# Patient Record
Sex: MALE | Race: WHITE | NOT HISPANIC OR LATINO | Employment: OTHER | URBAN - METROPOLITAN AREA
[De-identification: names, ages, dates, MRNs, and addresses within clinical notes are randomized per-mention and may not be internally consistent; named-entity substitution may affect disease eponyms.]

---

## 2017-12-18 ENCOUNTER — APPOINTMENT (OUTPATIENT)
Dept: SLEEP MEDICINE | Facility: HOSPITAL | Age: 57
End: 2017-12-18
Attending: INTERNAL MEDICINE

## 2018-01-08 ENCOUNTER — OFFICE VISIT (OUTPATIENT)
Dept: SLEEP MEDICINE | Facility: HOSPITAL | Age: 58
End: 2018-01-08
Attending: INTERNAL MEDICINE

## 2018-01-08 VITALS
HEART RATE: 101 BPM | BODY MASS INDEX: 40.43 KG/M2 | DIASTOLIC BLOOD PRESSURE: 81 MMHG | HEIGHT: 74 IN | WEIGHT: 315 LBS | SYSTOLIC BLOOD PRESSURE: 151 MMHG

## 2018-01-08 DIAGNOSIS — G47.33 OSA ON CPAP: Primary | ICD-10-CM

## 2018-01-08 DIAGNOSIS — Z99.89 OSA ON CPAP: Primary | ICD-10-CM

## 2018-01-08 PROCEDURE — G0463 HOSPITAL OUTPT CLINIC VISIT: HCPCS

## 2018-01-08 NOTE — PROGRESS NOTES
"Group: [facility]         CONSULT NOTE    Patient Identification:  Malcolm Millard  57 y.o.  male  1960  [id number]            Requesting physician: Primary care physician    Reason for Consultation:  KAT follow-up    CC:     History of Present Illness:  Patient is a pleasant 57-year-old gentleman known history of KAT with insomnia.  He was last seen 2015.  Currently on CPAP 14 cm.  He underwent lap band surgery last year and has lost a total of 185 pounds since 2011.  He continues to use his CPAP every night.  Unfortunately [facility] is down and unable to get any download today.  He feels rested in the morning.  Bedtime 10.  Awake time 7 AM.  Miochol abuse in abuse.  Currently has a nasal mask that fits well.  Masses 1-year-old.  [place] is 6 out of 24 within normal limits.      Review of Systems    Past Medical History:  No past medical history on file.  Reviewed  Past Surgical History:  No past surgical history on file.   Reviewed  Home Meds:    (Not in a hospital admission)  Reviewed  Allergies:  Allergies not on file    Social History:   Social History     Social History   • Marital status:      Spouse name: N/A   • Number of children: N/A   • Years of education: N/A     Occupational History   • Not on file.     Social History Main Topics   • Smoking status: Not on file   • Smokeless tobacco: Not on file   • Alcohol use Not on file   • Drug use: Not on file   • Sexual activity: Not on file     Other Topics Concern   • Not on file     Social History Narrative       Family History:  No family history on file.    Physical Exam:  /81  Pulse 101  Ht 188 cm (74\")  Wt (!) 155 kg (341 lb)  BMI 43.78 kg/m2 Body mass index is 43.78 kg/(m^2).   (!) 155 kg (341 lb)  Physical Exam  Middle-aged gentleman in no distress no labored breathing  ENT Mallampati 1  Neck is supple no bruit no adenopathy  Chest is clear CVS are regular rate and rhythm no  Abdomen is soft nontender bowel sounds " positive  Extremity no edema  CNS no deficits  No hallucination and delusional joint deformities or skin rashes    LABS:  No results found for: CALCIUM, PHOS    No results found for: CKTOTAL, CKMB, CKMBINDEX, TROPONINI, TROPONINT                              No results found for: TSH  CrCl cannot be calculated (No order found.).         Imaging: I personally visualized the images of scans/x-rays performed within last 3 days.      Assessment:  KAT  Obesity status post gastric bypass significant weight loss  Neurodermatitis  Insomnia      Recommendations:  Unfortunately download not available today  Continue CPAP 14 cm  Patient still in the process of losing weight  Will require re-titration versus restudied to see if KAT still present  Sleep hygiene measures  Supplies are renewed  Patient request new small CPAP air mini prescription given            Danielle Rodgers MD  1/8/2018  9:06 AM      Much of this encounter note is an electronic transcription/translation of spoken language to printed text using Dragon Software.

## 2019-03-25 ENCOUNTER — OFFICE VISIT (OUTPATIENT)
Dept: SLEEP MEDICINE | Facility: HOSPITAL | Age: 59
End: 2019-03-25

## 2019-03-25 VITALS
WEIGHT: 315 LBS | SYSTOLIC BLOOD PRESSURE: 122 MMHG | OXYGEN SATURATION: 96 % | HEART RATE: 84 BPM | HEIGHT: 75 IN | BODY MASS INDEX: 39.17 KG/M2 | DIASTOLIC BLOOD PRESSURE: 77 MMHG

## 2019-03-25 DIAGNOSIS — G47.33 OSA ON CPAP: Primary | ICD-10-CM

## 2019-03-25 DIAGNOSIS — Z99.89 OSA ON CPAP: Primary | ICD-10-CM

## 2019-03-25 PROCEDURE — G0463 HOSPITAL OUTPT CLINIC VISIT: HCPCS

## 2019-03-25 NOTE — PROGRESS NOTES
"[facility]         Dr Danielle Rodgers  [unfilled]  Patient Care Team:  Samia oBb MD as PCP - General  Samia Bob MD as PCP - Family Medicine    Chief Complaint: KAT with significant weight loss due to gastric bypass neurodermatitis and insomnia    Interval History: Patient returns today for a follow-up.  Currently on CPAP 14 cm.  Once again he did not bring his smart card and no modem on the system.  We are unable to review his download.  He tells me he religiously uses the machine.  He is still consistently losing weight.  Goes to bed 11:30 PM gets up between 8:55 AM.  Gets about 5 hours of sleep and feels rested and better with the CPAP.  He reports today mask leaks in his sleep bedtime.  No tobacco no alcohol no caffeine abuse.  His insulin was changed on the dosage.  He currently has a full facemask that leaks as above.  No parasomnias reported.    REVIEW OF SYSTEMS:   CARDIOVASCULAR: No chest pain, chest pressure or chest discomfort. No palpitations or edema.   RESPIRATORY: No shortness of breath, cough or sputum.   GASTROINTESTINAL: No anorexia, nausea, vomiting or diarrhea. No abdominal pain or blood.   HEMATOLOGIC: No bleeding or bruising.   [place] 2 out of 24 within normal limits  Ventilator/Non-Invasive Ventilation Settings (From admission, onward)    None            Vital Signs  Heart Rate:  [84] 84  BP: (122)/(77) 122/77  [unfilled]  Flowsheet Rows      First Filed Value   Admission Height  190.5 cm (75\") Documented at 03/25/2019 1300   Admission Weight  154 kg (340 lb)  (Abnormal)  Documented at 03/25/2019 1300          Physical Exam:   General Appearance:    Alert, cooperative, in no acute distress   Lungs:     Clear to auscultation,respirations regular, even and                  unlabored  ENT Mallampati between 3 and 4    Heart:    Regular rhythm and normal rate, normal S1 and S2, no            murmur, no gallop, no rub, no click   Chest Wall:    No abnormalities observed "   Abdomen:     Normal bowel sounds, no masses, no organomegaly, soft        non-tender, non-distended, no guarding, no rebound                tenderness   Extremities:   Moves all extremities well, no edema, no cyanosis, no             redness     Results Review:                                          I reviewed the patient's new clinical results.  I personally viewed and interpreted the patient's CXR        Medication Review:         No current facility-administered medications for this visit.     ASSESSMENT:   KAT  Obesity status post gastric bypass significant weight loss  Neurodermatitis  Insomnia        PLAN:  At this point we have gentleman with known history of KAT obesity with persistent weight loss due to gastric bypass  He tells me that he sometimes dozes off without the machine and he starts snoring.  He still uses the CPAP religiously palpation.  I have asked him to get his smart card so that I can review the compliance download  Advised patient to continue losing weight  We could do another sleep study once he is lost additional weight  Sleep hygiene measures  He uses Ambien as needed for insomnia  Advised patient sleep hygiene measures  We will see him back in 6 months    Danielle Rodgers MD  03/25/19  2:43 PM

## 2019-09-23 ENCOUNTER — OFFICE VISIT (OUTPATIENT)
Dept: SLEEP MEDICINE | Facility: HOSPITAL | Age: 59
End: 2019-09-23

## 2019-09-23 VITALS
HEIGHT: 75 IN | OXYGEN SATURATION: 94 % | WEIGHT: 315 LBS | SYSTOLIC BLOOD PRESSURE: 150 MMHG | HEART RATE: 96 BPM | BODY MASS INDEX: 39.17 KG/M2 | DIASTOLIC BLOOD PRESSURE: 94 MMHG

## 2019-09-23 DIAGNOSIS — Z99.89 OSA ON CPAP: Primary | ICD-10-CM

## 2019-09-23 DIAGNOSIS — G47.33 OSA ON CPAP: Primary | ICD-10-CM

## 2019-09-23 PROCEDURE — G0463 HOSPITAL OUTPT CLINIC VISIT: HCPCS

## 2019-09-23 NOTE — PROGRESS NOTES
"[facility] PULMONARY CARE         Dr Danielle Rodgers  [unfilled]  Patient Care Team:  Samia Bob MD as PCP - General  Samia Bob MD as PCP - Family Medicine    Chief Complaint: KAT with neurodermatitis and obesity    Interval History: Patient here for compliance visit.  As you recall last visit did not have his card.  Currently on CPAP 14 cm.  Compliance is 99% average daily use 8 hours 52 minutes.  AHI 4.4 events per hour leak high of 85 L.  Patient reports benefiting from CPAP.  He has had gastric bypass done and he lost some weight but gained some of it back.  Currently feels rested with the machine.  He sometimes complains the hose falls off and he finds it lying on the floor.  Goes to bed between 10 PM to 2 AM gets up at 9 PM.  Gets about 5 hours of sleep and feels rested.  No tobacco no alcohol no caffeine abuse.  Currently has a full facemask that fits well.    REVIEW OF SYSTEMS:   CARDIOVASCULAR: No chest pain, chest pressure or chest discomfort. No palpitations or edema.   RESPIRATORY: No shortness of breath, cough or sputum.   GASTROINTESTINAL: No anorexia, nausea, vomiting or diarrhea. No abdominal pain or blood.   HEMATOLOGIC: No bleeding or bruising.  Positive for shortness of breath  [place] 7 out of 24 within normal limits    Ventilator/Non-Invasive Ventilation Settings (From admission, onward)    None            Vital Signs  Heart Rate:  [96] 96  BP: (150)/(94) 150/94  [unfilled]  Flowsheet Rows      First Filed Value   Admission Height  190.5 cm (75\") Documented at 09/23/2019 1348   Admission Weight  162 kg (357 lb)  (Abnormal)  Documented at 09/23/2019 1348          Physical Exam:   General Appearance:    Alert, cooperative, in no acute distress   Lungs:     Clear to auscultation,respirations regular, even and                  unlabored  ENT Mallampati 1 with normal nasal exam    Heart:    Regular rhythm and normal rate, normal S1 and S2, no            murmur, no gallop, no rub, no click "   Chest Wall:    No abnormalities observed   Abdomen:     Normal bowel sounds, no masses, no organomegaly, soft        non-tender, non-distended, no guarding, no rebound                tenderness   Extremities:   Moves all extremities well, no edema, no cyanosis, no             redness     Results Review:                                          I reviewed the patient's new clinical results.  I personally viewed and interpreted the patient's CXR        Medication Review:         No current facility-administered medications for this visit.     ASSESSMENT:   KAT on CPAP  Obesity status post gastric bypass significant weight loss  Neurodermatitis  Insomnia    PLAN:  Reviewed compliance download compliance and AHI are okay however leak is high.  Patient thinks this is likely because the hose falls off the mask.  I have asked him to contact the DME company to see if he could have a way to keep the house in place.  It seems more of a technical issue  He still has a neurodermatitis currently being treated by his primary care physician  Improve sleep hygiene measures  Avoidance of alcohol at bedtime  I will see him back in 1 year    Danielle Rodgers MD  09/23/19  1:58 PM

## 2020-11-02 ENCOUNTER — OFFICE VISIT (OUTPATIENT)
Dept: SLEEP MEDICINE | Facility: HOSPITAL | Age: 60
End: 2020-11-02

## 2020-11-02 VITALS
DIASTOLIC BLOOD PRESSURE: 84 MMHG | WEIGHT: 315 LBS | SYSTOLIC BLOOD PRESSURE: 193 MMHG | HEART RATE: 99 BPM | OXYGEN SATURATION: 96 % | BODY MASS INDEX: 39.17 KG/M2 | HEIGHT: 75 IN

## 2020-11-02 DIAGNOSIS — G47.33 OSA ON CPAP: Primary | ICD-10-CM

## 2020-11-02 DIAGNOSIS — Z99.89 OSA ON CPAP: Primary | ICD-10-CM

## 2020-11-02 DIAGNOSIS — I10 ESSENTIAL HYPERTENSION: ICD-10-CM

## 2020-11-02 DIAGNOSIS — E66.01 MORBID (SEVERE) OBESITY DUE TO EXCESS CALORIES (HCC): ICD-10-CM

## 2020-11-02 PROCEDURE — G0463 HOSPITAL OUTPT CLINIC VISIT: HCPCS

## 2020-11-02 NOTE — PROGRESS NOTES
"[facility]  Telephone: [phone] / Fax: [phone] [place]  Telephone: [phone] / Fax: [phone] Jackeline Stewart    PCP: Samia Bob MD    Reason for visit: KAT f/u    Malcolm Millard is a 59 y.o.male  was last seen at [facility] sleep lab 1 year ago. He is a patient of Dr Cueto. He has been on the CPAP machine for many years. His most recent machine broke last night.  He got it around 2013. He is here today to obtain order for CPAP replacement. He wants to use [employer] for a new machine. His latest sleep study was done here in 2013. It showed moderate KAT with AHI 28/hr, supine AHI 35/hr.  He picked up 10 lbs during pandemic. Otherwise, he denies any interval changes. His BP was very high today. It is usually normal.  His sleep schedule is 11pm-6am. ESS is 6.    SH- no tobacco, no alcohol, drinks 3 cups of coffee per day, no energy drinks.    ROS- negative.    DME [facility]     Current Medications:  Unchanged  Dupixent added for eczema    Patient  has no past medical history on file.    I have reviewed the Past Medical History, Past Surgical History, Social History and Family History.    Vital Signs BP (!) 193/84 (usually it is normal)-  Pulse 99   Ht 190.5 cm (75\")   Wt (!) 167 kg (367 lb 8 oz)   SpO2 96%   BMI 45.93 kg/m²  Body mass index is 45.93 kg/m².    General Alert and oriented. No acute distress noted   Pharynx/Throat Class IV Mallampati airway, large tongue, no evidence of redundant lateral pharyngeal tissue. No oral lesions. No thrush. Moist mucous membranes.   Head Normocephalic. Symmetrical. Atraumatic.    Nose No septal deviation. No drainage   Chest Wall Normal shape. Symmetric expansion with respiration. No tenderness.   Neck Trachea midline, no thyromegaly or adenopathy    Lungs Clear to auscultation bilaterally. No wheezes. No rhonchi. No rales. Respirations regular, even and unlabored.   Heart Regular rhythm and normal rate. Normal S1 and S2. No murmur "   Abdomen Soft, non-tender and non-distended. Normal bowel sounds. No masses.   Extremities Moves all extremities well. No edema   Psychiatric Normal mood and affect.     Testing:  · Download 8/4/20-10/12/20- 76% use with average nightly use of 8 hours and 24 minutes on CPAP 14cm with AHI  5.1.    Study-PSG 2013- AHI 28/hr, supine AHI 35/hr.      Impression:  1. KAT on CPAP    2. Morbid (severe) obesity due to excess calories (CMS/HCC)    3. Essential hypertension          Plan:  BP is very high today. It is usually normal-per patient. He was advised to recheck BP when he gets home and inform PCP if still elevated. His machine is broken beyond repair and requires replacement ASAP. He wants to go to [facility] now to obtain replacement. I gave him a copy of the order and sleep study to take with him. He is also inquiring about portable machines. Separate order was provided to him so that he can explore online options if the price is less. As his AHI is slightly elevated and he gained some weight during pandemic, I will order the new machine at [employer] [id number]. He is currently on a fixed pressure of 14cm. He also needs new supplies.    He uses the CPAP device and benefits from its use in terms of reduction of hypersomnia and snoring.  AHI appears to be within adequate range. I reviewed download report and original sleep study report with the patient.  Weight loss will be strongly beneficial to reduce the severity of sleep-disordered breathing.  Caution during activities that require prolonged concentration is strongly advised if sleepiness returns.     Follow up with Dr. Rodgers in 8 weeks     Thank you for allowing me to participate in your patient's care.      ENRIQUE Sinha  [facility]  Phone: [phone]      Part of this note may be an electronic transcription/translation of spoken language to printed text using the Dragon Dictation System.

## 2020-12-14 ENCOUNTER — OFFICE VISIT (OUTPATIENT)
Dept: SLEEP MEDICINE | Facility: HOSPITAL | Age: 60
End: 2020-12-14

## 2020-12-14 VITALS
DIASTOLIC BLOOD PRESSURE: 75 MMHG | WEIGHT: 315 LBS | HEIGHT: 75 IN | OXYGEN SATURATION: 97 % | BODY MASS INDEX: 39.17 KG/M2 | SYSTOLIC BLOOD PRESSURE: 140 MMHG | HEART RATE: 102 BPM

## 2020-12-14 DIAGNOSIS — Z99.89 OSA ON CPAP: Primary | ICD-10-CM

## 2020-12-14 DIAGNOSIS — G47.33 OSA ON CPAP: Primary | ICD-10-CM

## 2020-12-14 PROCEDURE — G0463 HOSPITAL OUTPT CLINIC VISIT: HCPCS

## 2020-12-14 NOTE — PROGRESS NOTES
"[facility] PULMONARY CARE         Dr Danielle Rodgers  [unfilled]  Patient Care Team:  Samia Bob MD as PCP - General  Samia Bob MD as PCP - Family Medicine    Chief Complaint:. His latest sleep study was done here in 2013. It showed moderate KAT with AHI 28/hr, supine AHI 35/hr.     Interval History: Patient here for compliance visit.  His old CPAP broke and apparently [employer] ordered a new auto CPAP 14 to 20 cm.  He tells me that he has a loaner CPAP.  Compliance 98% average daily use 8 hours 55 minutes.  AHI leak both excellent.  Patient benefiting from current CPAP.  Wants his new auto CPAP ASAP.  Goes to bed at 8 PM gets up 9 AM gets about 10 hours of sleep and feels rested.  No tobacco no alcohol no caffeine abuse.  Currently has a full facemask that fits well.  REVIEW OF SYSTEMS:   CARDIOVASCULAR: No chest pain, chest pressure or chest discomfort. No palpitations or edema.   RESPIRATORY: No shortness of breath, cough or sputum.   GASTROINTESTINAL: No anorexia, nausea, vomiting or diarrhea. No abdominal pain or blood.   HEMATOLOGIC: No bleeding or bruising.  [place] 5 out of 24 within normal limits    Ventilator/Non-Invasive Ventilation Settings (From admission, onward)    None            Vital Signs  Heart Rate:  [102] 102  BP: (140)/(75) 140/75  [unfilled]  Flowsheet Rows      First Filed Value   Admission Height  190.5 cm (75\") Documented at 12/14/2020 1103   Admission Weight  (!) 167 kg (369 lb) Documented at 12/14/2020 1103          Physical Exam:  Patient is examined using the personal protective equipment as per guidelines from infection control for this particular patient as enacted.  Hand hygiene was performed before and after patient interaction.   General Appearance:    Alert, cooperative, in no acute distress.  Following simple commands  Neck midline trachea no thyromegaly   Lungs:     Clear to auscultation,respirations regular, even and                  unlabored  ENT Mallampati between 3 " and 4 normal nasal exam    Heart:    Regular rhythm and normal rate, normal S1 and S2, no            murmur, no gallop, no rub, no click   Chest Wall:    No abnormalities observed   Abdomen:     Normal bowel sounds, no masses, no organomegaly, soft        non-tender, non-distended, no guarding, no rebound                tenderness   Extremities:   Moves all extremities well, no edema, no cyanosis, no             redness  CNS no focal neurological deficits normal sensory exam  Skin no rashes no nodules  Musculoskeletal no cyanosis no clubbing normal range of motion     Results Review:                                          I reviewed the patient's new clinical results.  I personally viewed and interpreted the patient's CXR        Medication Review:       No current facility-administered medications for this visit.       ASSESSMENT:   KAT on CPAP  Obesity status post gastric bypass significant weight loss  Neurodermatitis  Insomnia    PLAN:  1 patient apparently needs a new CPAP machine.  He tells me that he has been provided with a loaner.  Compliance download on the current CPAP auto 14 to 20 cm excellent with compliance 98% AHI and leak are excellent.  I will attempt to get him a new auto CPAP same pressure 14 to 20 cm ASAP.  Sleep hygiene measures  Weight loss to be continued  Treatment of underlying comorbidities  Sleep hygiene measures  Follow-up in 1 year    Danielle Rodgers MD  12/14/20  11:24 EST

## 2021-03-01 ENCOUNTER — OFFICE VISIT (OUTPATIENT)
Dept: SLEEP MEDICINE | Facility: HOSPITAL | Age: 61
End: 2021-03-01

## 2021-03-01 VITALS
SYSTOLIC BLOOD PRESSURE: 156 MMHG | WEIGHT: 315 LBS | HEIGHT: 74 IN | BODY MASS INDEX: 40.43 KG/M2 | OXYGEN SATURATION: 96 % | DIASTOLIC BLOOD PRESSURE: 94 MMHG | HEART RATE: 86 BPM

## 2021-03-01 DIAGNOSIS — G47.33 OSA ON CPAP: Primary | ICD-10-CM

## 2021-03-01 DIAGNOSIS — Z99.89 OSA ON CPAP: Primary | ICD-10-CM

## 2021-03-01 PROCEDURE — G0463 HOSPITAL OUTPT CLINIC VISIT: HCPCS

## 2021-03-01 NOTE — PROGRESS NOTES
"[facility] PULMONARY CARE         Dr Danielle Rodgers  [unfilled]  Patient Care Team:  Samia Bob MD as PCP - General  Samia Bob MD as PCP - Family Medicine    Chief Complaint:His latest sleep study was done here in 2013. It showed moderate KAT with AHI 28/hr, supine AHI 35/    Interval History: Patient here for compliance visit after getting a new auto CPAP.  Currently on auto CPAP 14 to 20 cm.  Previously his compliance AHI leak was excellent.  Unfortunately with a new CPAP we do not have the compliance download today.  He tells me he does not have a smart card in his machine.  He feels rested with a new CPAP and feels it is working well.  Goes to bed 1130 gets up 9 gets about 7 hours of sleep and feels rested.  No tobacco no alcohol no caffeine abuse.  Still has neurodermatitis and takes Dupixent with good control of it.  Supplies have been adequate for [employer].    REVIEW OF SYSTEMS:   CARDIOVASCULAR: No chest pain, chest pressure or chest discomfort. No palpitations or edema.   RESPIRATORY: No shortness of breath, cough or sputum.   GASTROINTESTINAL: No anorexia, nausea, vomiting or diarrhea. No abdominal pain or blood.   HEMATOLOGIC: No bleeding or bruising.  Positive for nasal congestion shortness of breath  [place] 7 out of 24 within normal limits    Ventilator/Non-Invasive Ventilation Settings (From admission, onward)    None            Vital Signs  Heart Rate:  [86] 86  BP: (156)/(94) 156/94  [unfilled]  Flowsheet Rows      First Filed Value   Admission Height  186.7 cm (73.5\") Documented at 03/01/2021 1057   Admission Weight  (!) 176 kg (389 lb) Documented at 03/01/2021 1057          Physical Exam:  Patient is examined using the personal protective equipment as per guidelines from infection control for this particular patient as enacted.  Hand hygiene was performed before and after patient interaction.   General Appearance:    Alert, cooperative, in no acute distress.  Following simple " commands  Neck midline trachea no thyromegaly   Lungs:     Clear to auscultation,respirations regular, even and                  unlabored  ENT Mallampati between 3 and 4 normal nasal exam    Heart:    Regular rhythm and normal rate, normal S1 and S2, no            murmur, no gallop, no rub, no click   Chest Wall:    No abnormalities observed   Abdomen:     Normal bowel sounds, no masses, no organomegaly, soft        non-tender, non-distended, no guarding, no rebound                tenderness   Extremities:   Moves all extremities well, no edema, no cyanosis, no             redness  CNS no focal neurological deficits normal sensory exam  Skin no rashes no nodules  Musculoskeletal no cyanosis no clubbing normal range of motion     Results Review:                                          I reviewed the patient's new clinical results.  I personally viewed and interpreted the patient's CXR        Medication Review:       No current facility-administered medications for this visit.       ASSESSMENT:   KAT on CPAP  Obesity status post gastric bypass  Neurodermatitis  Insomnia      PLAN:  New CPAP working well per patient.  We will attempt to get download after getting him a new smart card.  Weight loss encouraged  He is gaining back all the weight that he lost from his bypass  Weight loss encouraged  Insomnia sleep hygiene measures  We will review download and make adjustment accordingly      Danielle Rodgers MD  03/01/21  11:09 EST

## 2021-03-19 ENCOUNTER — BULK ORDERING (OUTPATIENT)
Dept: CASE MANAGEMENT | Facility: OTHER | Age: 61
End: 2021-03-19

## 2021-03-19 DIAGNOSIS — Z23 IMMUNIZATION DUE: ICD-10-CM

## 2022-02-28 ENCOUNTER — OFFICE VISIT (OUTPATIENT)
Dept: SLEEP MEDICINE | Facility: HOSPITAL | Age: 62
End: 2022-02-28

## 2022-02-28 VITALS
OXYGEN SATURATION: 95 % | HEIGHT: 74 IN | DIASTOLIC BLOOD PRESSURE: 81 MMHG | BODY MASS INDEX: 40.43 KG/M2 | WEIGHT: 315 LBS | SYSTOLIC BLOOD PRESSURE: 163 MMHG | HEART RATE: 99 BPM

## 2022-02-28 DIAGNOSIS — Z99.89 OSA ON CPAP: Primary | ICD-10-CM

## 2022-02-28 DIAGNOSIS — G47.33 OSA ON CPAP: Primary | ICD-10-CM

## 2022-02-28 PROCEDURE — G0463 HOSPITAL OUTPT CLINIC VISIT: HCPCS

## 2022-02-28 NOTE — PROGRESS NOTES
"[facility] PULMONARY CARE         Dr Danielle Rodgers  [unfilled]  Patient Care Team:  Samia Bob MD as PCP - General  Samia Bob MD as PCP - Family Medicine    Chief Complaint:His latest sleep study was done here in 2013. It showed moderate KAT with AHI 28/hr, supine AHI 35/    Interval History: Patient here for compliance visit annual visit.  Unfortunately compliance download not available today.  He tells me he uses the machine and currently mask is broken and request a new supplies and mask.  Goes to bed 12 gets up 930 gets about 7 to 8 hours of sleep more rested with CPAP.  No tobacco no alcohol no caffeine abuse.  Currently has a full facemask which currently is broken but generally fits well.  He gets Dupixent for his neurodermatitis.  Also got Covid in July and received a monoclonal antibody.    REVIEW OF SYSTEMS:   CARDIOVASCULAR: No chest pain, chest pressure or chest discomfort. No palpitations or edema.   RESPIRATORY: No, cough or sputum.   GASTROINTESTINAL: No anorexia, nausea, vomiting or diarrhea. No abdominal pain or blood.   HEMATOLOGIC: No bleeding or bruising.  Positive for nasal congestion shortness of breath  [place] 3 out of 24 within normal limits    Ventilator/Non-Invasive Ventilation Settings (From admission, onward)            None            Vital Signs  Heart Rate:  [99] 99  BP: (163)/(81) 163/81  [unfilled]  Flowsheet Rows      First Filed Value   Admission Height 188 cm (74\") Documented at 02/28/2022 1136   Admission Weight 173 kg (382 lb) Documented at 02/28/2022 1136          Physical Exam:  Patient is examined using the personal protective equipment as per guidelines from infection control for this particular patient as enacted.  Hand hygiene was performed before and after patient interaction.   General Appearance:    Alert, cooperative, in no acute distress.  Following simple commands  ENT Mallampati between 3 and 4 no nasal congestion  Neck midline trachea, no thyromegaly "   Lungs:     Clear to auscultation, respirations regular, even and                  unlabored    Heart:    Regular rhythm and normal rate, normal S1 and S2, no            murmur, no gallop, no rub, no click   Chest Wall:    No abnormalities observed   Abdomen:     Normal bowel sounds, no masses, no organomegaly, soft        nontender, nondistended, no guarding, no rebound                tenderness   Extremities:   Moves all extremities well, no edema, no cyanosis, no             redness  CNS no focal neurological deficits normal sensory exam  Skin no rashes no nodules  Musculoskeletal no cyanosis no clubbing normal range of motion     Results Review:                                          I reviewed the patient's new clinical results.  I personally viewed and interpreted the patient's chest x-ray.        Medication Review:       No current facility-administered medications for this visit.      ASSESSMENT:   KAT on CPAP  Obesity status post gastric bypass  Neurodermatitis  Insomnia    PLAN:  Unfortunately compliance download is still pending at this time.  I will review compliance download and decide if there is any changes that needs to be made.  Clinically seems to be doing better  Supplies to be renewed and ordered right now  Weight loss encouraged  Treatment of underlying comorbidities  Remains on Dupixent for neurodermatitis  Insomnia better controlled with CPAP  Follow-up in 1 year      Danielle Rodgers MD  02/28/22  11:51 EST

## 2023-03-13 ENCOUNTER — OFFICE VISIT (OUTPATIENT)
Dept: SLEEP MEDICINE | Facility: HOSPITAL | Age: 63
End: 2023-03-13
Payer: COMMERCIAL

## 2023-03-13 VITALS
DIASTOLIC BLOOD PRESSURE: 96 MMHG | HEIGHT: 74 IN | BODY MASS INDEX: 40.43 KG/M2 | OXYGEN SATURATION: 97 % | HEART RATE: 106 BPM | WEIGHT: 315 LBS | SYSTOLIC BLOOD PRESSURE: 159 MMHG

## 2023-03-13 DIAGNOSIS — G47.33 OSA ON CPAP: Primary | ICD-10-CM

## 2023-03-13 DIAGNOSIS — Z99.89 OSA ON CPAP: Primary | ICD-10-CM

## 2023-03-13 PROCEDURE — G0463 HOSPITAL OUTPT CLINIC VISIT: HCPCS

## 2023-03-13 NOTE — PROGRESS NOTES
[facility]         Dr Danielle Rodgers  [unfilled]  Patient Care Team:  Samia Bob MD as PCP - General  Samia Bob MD as PCP - Family Medicine    Chief Complaint:His latest sleep study was done here in 2013. It showed moderate KAT with AHI 28/hr, supine AHI 35/    Interval History: Patient here for compliance visit.  Currently on auto CPAP 14 to 20 cm.  Compliance 100% average daily use 9 hours 0 minutes.  AHI and leak within normal limits.  Goes to bed 12 gets up 7 AM gets about 6+ hours of sleep and feels rested.  No tobacco no alcohol no caffeine abuse.  Currently still having issues with his sciatica and trying to deal with it.  Patient has a full facemask that fits well.  Supplies have been adequate.    REVIEW OF SYSTEMS:   CARDIOVASCULAR: No chest pain, chest pressure or chest discomfort. No palpitations or edema.   RESPIRATORY: No shortness of breath, cough or sputum.   GASTROINTESTINAL: No anorexia, nausea, vomiting or diarrhea. No abdominal pain or blood.   HEMATOLOGIC: No bleeding or bruising.     Ventilator/Non-Invasive Ventilation Settings (From admission, onward)    None            Vital Signs     [unfilled]      Physical Exam:  Patient is examined using the personal protective equipment as per guidelines from infection control for this particular patient as enacted.  Hand hygiene was performed before and after patient interaction.   General Appearance:    Alert, cooperative, in no acute distress.  Following simple commands  ENT Mallampati between 3 and 4 no nasal congestion  Neck midline trachea, no thyromegaly   Lungs:     Clear to auscultation, respirations regular, even and                  unlabored    Heart:    Regular rhythm and normal rate, normal S1 and S2, no            murmur, no gallop, no rub, no click   Chest Wall:    No abnormalities observed   Abdomen:     Normal bowel sounds, no masses, no organomegaly, soft        nontender, nondistended, no guarding, no  rebound                tenderness   Extremities:   Moves all extremities well, no edema, no cyanosis, no             redness  CNS no focal neurological deficits normal sensory exam  Skin no rashes no nodules  Musculoskeletal no cyanosis no clubbing normal range of motion     Results Review:                                          I reviewed the patient's new clinical results.  I personally viewed and interpreted the patient's chest x-ray.        Medication Review:       No current facility-administered medications for this visit.      ASSESSMENT:   KAT on CPAP  Obesity status post gastric bypass  Neurodermatitis  Insomnia    PLAN:  Reviewed compliance download with the patient  Compliance AHI leak look excellent  Continue current auto CPAP 14 to 20 cm  Sleep hygiene measures  Treatment of underlying comorbidities  Weight loss encouraged    Danielle Rodgers MD  03/13/23  11:14 EDT